# Patient Record
Sex: FEMALE | Race: WHITE
[De-identification: names, ages, dates, MRNs, and addresses within clinical notes are randomized per-mention and may not be internally consistent; named-entity substitution may affect disease eponyms.]

---

## 2018-01-22 NOTE — HISTORY & PHYSICAL PRE-OP
General Information and HPI
History of Present Illness:
Kera is a 39-year-old female with a long-standing was complaining of pain to
both her left and right heels.  The patient has undergone an extended course of 
conservative care, including shoe gear and activity modification, rest, 
immobilization and courses of NSAIDs.  None of this is yielded her any 
significant relief.  The patient presents today for preoperative surgical 
consultation.
 
Allergies/Medications
Allergies:
Coded Allergies:
NO KNOWN ALLERGIES (02/26/13)
 
Home Med list
Ibuprofen (Motrin 600 MG Tab) 600 MG TAB   1 TAB PO Q6P PRN PAIN
Levothyroxine Sodium 50 MCG TABLET   1 TAB PO DAILY AC THYROID  (Reported)
Meclizine (Antivert) 25 MG PAC   1 TAB PO TID DIZZINESS/VERTIGO
Ondansetron (Zofran Odt) 4 MG ODT   1 TAB SL Q4-6 PRN NAUSEA
Simvastatin (Zocor 10MG Tab) 10 MG TAB   1 TAB PO DAILY CHOLESTEROL  (Reported)
 
 
Past History
 
Medical History
Neurological: MIGRAINES
Cardiovascular: HIGH CHOLESTEROL
Endocrine: HYPOTHYROID
 
Surgical History
Pertinent Surgical History: non-contributory
 
Review of Systems
Review of Systems:
Unremarkable except for that noted in history of present illness
 
Exam & Diagnostic Data
Physical Exam:
Lungs clear bilaterally.  Heart sounds rate and rhythm regular.  Lower extremity
physical exam demonstrates intact pedal pulses bilaterally.  Pulses dorsalis 
pedis and posterior tibial arteries are palpable bilaterally.  Patient without 
any sensory motor deficits.  Reflexes grossly intact.  Patient noted to have 
significant pain with palpation of plantar medial aspect of the left and right 
heels.  Negative Tinel sign noted with percussion the posterior tibial nerve.  
Ankle range of motion noted to be diminished, especially in dorsiflexion.
 
Assessment/Plan
Assessment/Plan:
Plantar fasciitis left and right feet.  A lengthy discussion reviewing both 
surgical and conservative options was held the patient at bedside and the 
patient elects to go forward with surgery despite the risks.
 
As Ranked By This Provider
Problem List:
 1. Plantar fascial fibromatosis
 
 
Attending MD Review Statement
 
Attending Statement
Attending MD Statement: examined this patient

## 2018-01-23 ENCOUNTER — HOSPITAL ENCOUNTER (OUTPATIENT)
Dept: HOSPITAL 68 - STS | Age: 48
End: 2018-01-23
Attending: PODIATRIST
Payer: COMMERCIAL

## 2018-01-23 VITALS — BODY MASS INDEX: 34.86 KG/M2 | WEIGHT: 249 LBS | HEIGHT: 71 IN

## 2018-01-23 DIAGNOSIS — M21.6X1: Primary | ICD-10-CM

## 2018-01-23 DIAGNOSIS — M72.2: ICD-10-CM

## 2018-01-23 DIAGNOSIS — M76.62: ICD-10-CM

## 2018-01-23 DIAGNOSIS — M67.873: ICD-10-CM

## 2018-01-23 DIAGNOSIS — M21.6X2: ICD-10-CM

## 2018-01-23 DIAGNOSIS — M76.61: ICD-10-CM

## 2018-01-23 NOTE — OPERATIVE REPORT
Operative/Inv Procedure Report
Surgery Date: 01/23/18
Name of Procedure:
1 gastrocnemius recession right
2 gastrocnemius recession left
3 debridement Achilles tendinosis right
4 debridement Achilles tendinosis left
5 intraoperative administration of ankle block anesthesia
Pre-Operative Diagnosis:
1 gastrocnemius equinus right
2 gastrocnemius equinus left
3 Achilles tendinosis right
4 Achilles tendinosis left
Post-Operative Diagnosis:
The same
Estimated Blood Loss: scant
Surgeon/Assistant:
Rafael Davis DPM
 
Anesthesia: moderate sedation, block
 
Operative/Procedure Note
Note:
After obtaining informed consent the patient was brought to the operating room 
and placed on the operating table in supine position.  The patient isn't 
securely fastened to the operating table utilizing safety belt.  After 
administration of IV sedation, 10 mL of 0.5% Marcaine plain was obtained about 
the patient's left and right ankles.  2 well-padded calf tourniquets were placed
about the patient's left and right upper cast.  2 g of Ancef were delivered 
intravenously times one dose.  Left right feet were then scrubbed prepped and 
draped in usual aseptic manner.  The right lower extremity was elevated to 
examine to limb, which point the calf tourniquet inflated 250 mmHg.  Attention 
directed to the distal medial right leg, where a linear incision was made 2 
finger rest distal to the medial gastrocnemius muscle.  Dissection was then 
carried down to the medial margin of the gastroc fascia, with the peritenon was 
.  An interval was developed between the peritenon and the fascia.  
Gastroc the meniscus recession was performed after the sural nerve was 
identified and protected.  The deep tissues reapproximated 4-0 Vicryl skin edges
reprepped for nylon.  Attention was then directed to the posterior aspect of the
right ankle where the Achilles was noted to insert posteriorly.  Partial 
developed 62 K wire and the tendon was ablated at 5 mm intervals overlying the 
area of greatest tenderness.  The dissection was then carried down and the 
tissue was  from its origin overlying the enthesophyte.  Within the 
incision was closed with 4-0 nylon.  The dressings were then cleaned and 
[place] dressings and Coban were placed about the patient's right lower 
extremity.  The tourniquet was then deflated.  Next, the left lower extremity is
elevated to examine to limb at which point the calf tourniquet inflated 250 
mmHg.  Attention directed distal medial left leg, where a linear incision was 
made 2 fingerbreadths distal and medial gastrocnemius muscle.  An interval was 
developed between the peritenon and fascia.  The sural nerve was identified 
protected and a gastrocnemius recession was performed.  The deep tissues reports
a 4-0 Vicryl skin edges reprepped for nylon.  Attention was then directed to the
posterior aspect of the left heel at the level of the insertion of the Achilles,
where a partial developed a sterile 62 K wire.  Attention was then ablated 
utilizing the Topaz micro-ablator.  Again, the tendon was released from its 
origin on the enthesophyte.  Again tissues reports a 4-0 Vicryl and the incision
dressed with [place] dressings and Coban.  The patient is noted tolerate
both procedure and anesthesia well and the patient was transported from the 
operating room to recovery by sent stable best assess intact to bilateral feet.